# Patient Record
Sex: FEMALE | Race: WHITE | NOT HISPANIC OR LATINO | Employment: STUDENT
[De-identification: names, ages, dates, MRNs, and addresses within clinical notes are randomized per-mention and may not be internally consistent; named-entity substitution may affect disease eponyms.]

---
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
## 2025-01-03 NOTE — PROGRESS NOTES
Medication Therapy Management (MTM) Encounter    ASSESSMENT:                            Medication Adherence/Access: No issues identified.    Mental Health:  Anxiety   Pharmacogenetic (PGx) Results:    Please see [employer] results scanned in media tab (8/12/24) for complete list of results   Results relevant for PGx consult reason:    CYP2B6 normal metabolizer: normal CYP2B6 function  ZDO0D63 intermediate metabolizer: decreased WBM9H17 function  CYP2D6 intermediate metabolizer: decreased CYP2D6 function    TMJ9V65 Intermediate Metabolizer  a. Expected to have somewhat increased levels of medications metabolized by KVV6H89 (e.g., tertiary TCAs, sertraline, escitalopram, citalopram, PPIs, voriconazole ) and may be at increased risk of side effects. Expected to have somewhat decreased levels of medications activated by QOK7T74 (e.g., clopidogrel) and may be at risk for lack of efficacy.   b. Current medications affected: none  c. Past medications impacted: sertraline    CYP2D6 Intermediate Metabolizer  a. Expected to have somewhat increased levels of medications metabolized by CYP2D6 (e.g., TCAs, atomoxetine, aripiprazole, brexpiprazole, haloperidol, risperidone, paroxetine, fluvoxamine, venlafaxine, vortioxetine, ondansetron, metoprolol ) and may be at increased risk of side effects.   b. Current medications affected: none  c. Past medications impacted: fluvoxamine    Pharmacogenetic Assessment and Recommendations:   Several antidepressants are metabolized by XNF9N29 and/or CYP2D6 both of which patient has intermediate activity of. Discussed that this can result in higher levels of medications processed through these enzymes and given this and patient's past med history may be prudent to start at lower doses and monitor closely for side effects if any of these medications are started. Antidepressants that are processed normally by patient include: Wellbutrin, Pristiq, Viibryd, and Fetzima.       PLAN:                             Pharmacogenetic (PGx) results reviewed  Follow-up with psychiatry provider for medication next steps.     Follow-up: MTM as requested    SUBJECTIVE/OBJECTIVE:                          Mey Harper is a 19 year old adult seen for an initial visit. She was referred to me from psychiatry     Reason for visit: [employer] results.    Allergies/ADRs: Reviewed in chart  Past Medical History: Reviewed in chart  Tobacco: She reports that she has never smoked. She has never been exposed to tobacco smoke. She has never used smokeless tobacco.  Alcohol: none    Medication Adherence/Access: no issues reported.    Mental Health   Anxiety  Hydroxyzine as needed      Saw psychiatry provider [facility] 11/13/24; following plan was made:   1) MEDICATION:      -Stop Cymbalta 30 mg daily, pt decreased dose        - Continue hydroxyzine 12.5-25 mg up to twice daily as needed for anxiety    Therapies tried and response:   - fluvoxamine 200mg  - had been on since age 12; stopped in 2024. Stopped working  - sertraline  - reports significantly increased depression and negatively impacted their life, insomnia    Today, patient reports pharmacogenetic (PGx) testing was obtained prior to starting any new antidepressants. They report no side effects from Cymbalta initially, but with higher doses had  insomnia, restlessness/trouble focusing. These side effects have resolved since stopping it.            ----------------      I spent 30 minutes with this patient today.      A summary of these recommendations was sent via [employer].    Marti Guerrero, PharmD, BCPP  Medication Therapy Management Pharmacist  [facility]      Telemedicine Visit Details  The patient's medications can be safely assessed via a telemedicine encounter.  Type of service:  Video Conference via JANZZ  Originating Location (pt. Location): Home    Distant Location (provider location):  Off-site  Start Time: 10:08 AM  End Time:   10:38 AM     Medication Therapy Recommendations  No medication therapy recommendations to display

## 2025-01-06 ENCOUNTER — VIRTUAL VISIT (OUTPATIENT)
Dept: PHARMACY | Facility: CLINIC | Age: 20
End: 2025-01-06
Payer: COMMERCIAL

## 2025-01-06 DIAGNOSIS — F41.1 GAD (GENERALIZED ANXIETY DISORDER): Primary | ICD-10-CM

## 2025-01-06 PROCEDURE — 99207 PR NO CHARGE LOS: CPT | Mod: 95 | Performed by: PHARMACIST

## 2025-01-06 NOTE — Clinical Note
Hello,   I met with patient to review pharmacogenetic (PGx) results. Please see note for summary of results.  Thank you!  Marti Guerrero, PharmD, BCPP Medication Therapy Management Pharmacist [facility]

## 2025-01-06 NOTE — PATIENT INSTRUCTIONS
"Recommendations from today's MTM visit:                                                      Pharmacogenetic (PGx) results reviewed  Follow-up with psychiatry provider for medication next steps.     Follow-up: MTM as requested    It was great speaking with you today.  I value your experience and would be very thankful for your time in providing feedback in our clinic survey. In the next few days, you may receive an email or text message from [facility] with a link to a survey related to your  clinical pharmacist.\"     To schedule another MTM appointment, please call the clinic directly or you may call the MTM scheduling line at [phone] or toll-free at [phone].     My Clinical Pharmacist's contact information:                                                      Please feel free to contact me with any questions or concerns you have.      Marti Guerrero, PharmD, BCPP  Medication Therapy Management Pharmacist  [facility]    "

## 2025-01-08 ASSESSMENT — ANXIETY QUESTIONNAIRES
4. TROUBLE RELAXING: SEVERAL DAYS
2. NOT BEING ABLE TO STOP OR CONTROL WORRYING: NEARLY EVERY DAY
6. BECOMING EASILY ANNOYED OR IRRITABLE: MORE THAN HALF THE DAYS
3. WORRYING TOO MUCH ABOUT DIFFERENT THINGS: MORE THAN HALF THE DAYS
8. IF YOU CHECKED OFF ANY PROBLEMS, HOW DIFFICULT HAVE THESE MADE IT FOR YOU TO DO YOUR WORK, TAKE CARE OF THINGS AT HOME, OR GET ALONG WITH OTHER PEOPLE?: VERY DIFFICULT
5. BEING SO RESTLESS THAT IT IS HARD TO SIT STILL: NOT AT ALL
1. FEELING NERVOUS, ANXIOUS, OR ON EDGE: NEARLY EVERY DAY
7. FEELING AFRAID AS IF SOMETHING AWFUL MIGHT HAPPEN: MORE THAN HALF THE DAYS
IF YOU CHECKED OFF ANY PROBLEMS ON THIS QUESTIONNAIRE, HOW DIFFICULT HAVE THESE PROBLEMS MADE IT FOR YOU TO DO YOUR WORK, TAKE CARE OF THINGS AT HOME, OR GET ALONG WITH OTHER PEOPLE: VERY DIFFICULT
GAD7 TOTAL SCORE: 13
GAD7 TOTAL SCORE: 13

## 2025-01-09 ENCOUNTER — VIRTUAL VISIT (OUTPATIENT)
Dept: PSYCHIATRY | Facility: CLINIC | Age: 20
End: 2025-01-09
Attending: PSYCHIATRY & NEUROLOGY
Payer: COMMERCIAL

## 2025-01-09 VITALS — WEIGHT: 135 LBS | BODY MASS INDEX: 20.46 KG/M2 | HEIGHT: 68 IN

## 2025-01-09 DIAGNOSIS — F41.1 GAD (GENERALIZED ANXIETY DISORDER): Primary | ICD-10-CM

## 2025-01-09 RX ORDER — DESVENLAFAXINE 25 MG/1
25 TABLET, EXTENDED RELEASE ORAL DAILY
Qty: 30 TABLET | Refills: 1 | Status: SHIPPED | OUTPATIENT
Start: 2025-01-09

## 2025-01-09 ASSESSMENT — PAIN SCALES - GENERAL: PAINLEVEL_OUTOF10: NO PAIN (0)

## 2025-01-09 NOTE — PROGRESS NOTES
"  Virtual Visit Details    Type of service:  Video Visit     Originating Location (pt. Location): Home    Distant Location (provider location):  On-site  Platform used for Video Visit: Winifred     Elva Harper is a 19 year old who is being evaluated via a billable video visit.      Pt will join video visit via: Winifred  If there are problems joining the visit, send backup video invite via: Send to preferred e-mail: [email or web address]    Reason for telehealth visit: Patient has requested telehealth visit    Originating location (patient location): Patient's home    Will anyone else be joining the visit? No           PSYCHIATRY CHILD CLINIC PROGRESS NOTE          .Telehealth Details  Type of service:  medication management  Date/time of service: 1/9/25  Start Time: 9. 52 AM  End Time:  10: 14 AM  Chart review/documentation/orders : 30 minutes  Total time : 52   minutes    Distant Site (provider location):  Off-site  Mode of Communication:  Winifred    INTERIM HISTORY                                               Elva Harper is a 19 year old female with a hx of JANAE who presents for an evaluation for transfer of medication management. Patient last seen on 10/4/24 at which time Cymbalta was discontinued. Pt was seen alone    Since the last visit, patient states that she managed to complete the semester without ant meds, but has been struggling more with her anxiety, mom says I am off\" she states that she had the MTM consult recently and they identified 3 meds in the \"use as directed\" zone that she would like to discuss today. She states that she goes back to school around [facility], working now to change her major to better suite her needs. Pt notes that she is still seeing her therapist weekly and notes no SI or SIB or safety concerns.      Social Updates (home/ school/ substance use):  Family relationships: good     School:   Year: freshman at the [employer] - has generals completed, plans to build her own degree in " Design/production/design  IEP/504/Special Education: 504  Suspensions/Expulsions: N/A  Grades: pretty good  School functioning: good balance    RECENT SYMPTOMS:   DEPRESSION:  reports-mood dysregulation;  DENIES- suicidal ideation, depressed mood, low energy, poor concentration /memory and feeling hopeless  ANXIETY:  excessive worry, nervous/overwhelmed and this is more  TRAUMA RELATED:  avoidance, trauma trigger psychological / physiological response, negative beliefs / emotions and hypervigilance  SLEEP:  no issues  EATING DISORDER: none    RECENT SUBSTANCE USE:     None     CURRENT SOCIAL HISTORY:  Financial Support- family or friend.     Siblings- brother 14 and sister 11.     Living Situation- with parents and siblings.    ECFE  0-3, dad works in [place] for [employer]  Social/Spiritual Support- family and therapist.     Feels Safe at Home- Yes.    MEDICAL ROS:  Reports A comprehensive review of systems was performed and is negative other than noted in the HPI..  Denies sedation, fatigue, headache, diaphoresis, dizziness.    PAST PSYCH MED TRIALS     Luvox ( 200 mg ) benefits for anxiety management waned    MEDICAL / SURGICAL HISTORY                                   CARE TEAM:          PCP- Dr Helton                   Therapist- Cora Fry at [facility]    Pregnant or breastfeeding:  NO      Contraception- none  Patient Active Problem List   Diagnosis    Amblyopia    JANAE (generalized anxiety disorder)    Penicillin allergy    Seasonal allergic rhinitis due to pollen    Allergic rhinitis due to cats    Abnormal increased muscle tone       ALLERGY                                Pcn [penicillins]  MEDICATIONS                               Current Outpatient Medications   Medication Sig Dispense Refill    Acetaminophen (TYLENOL PO) Take by mouth every 4 hours as needed for mild pain or fever      hydrOXYzine HCl (ATARAX) 25 MG tablet Take 0.5-1 tablets (12.5-25 mg) by mouth 2  "times daily as needed for anxiety. 30 tablet 2    naproxen sodium 220 MG capsule Take 220 mg by mouth 2 times daily (with meals)         VITALS   Ht 1.727 m (5' 8\")   Wt 61.2 kg (135 lb)   BMI 20.53 kg/m     MENTAL STATUS EXAM                                                             Alertness: alert  and oriented  Appearance: casually groomed  Behavior/Demeanor: cooperative and calm , with good  eye contact   Speech: normal, regular rate and rhythm and precise tonality  Language: intact and no problems  Psychomotor: normal or unremarkable  Mood:  \"not doing well\"  Affect: restricted and appropriate; was congruent to mood; was congruent to content  Thought Process/Associations:  concrete  Thought Content:  Reports none;  Denies suicidal and violent ideation  Perception:  Reports none;  Denies auditory hallucinations and visual hallucinations  Insight: fair  Judgment: fair  Cognition: does  appear grossly intact; formal cognitive testing was not done    LABS and DATA       PHQ9 TODAY = Answers submitted by the patient for this visit:  Answers submitted by the patient for this visit:  Patient Health Questionnaire (G7) (Submitted on 1/8/2025)  JANAE 7 TOTAL SCORE: 13              6/25/2024    12:23 PM 7/25/2024     2:21 PM   PHQ   PHQ-9 Total Score 7 0   Q9: Thoughts of better off dead/self-harm past 2 weeks Not at all Not at all       PSYCHIATRIC DIAGNOSES                                                                                                   Generalized Anxiety Disorder    ASSESSMENT                                   Elva Harper is a 19 year old female with a hx of JANAE who presents for an evaluation for transfer of medication management. Genetic loading for MH concerns include Anxiety. There are no medical diagnoses contributing to presentation, however patient has a hx of Sensory processing disorder which could be impacting presentation.. No critical item hx. Patient does meet criteria for a JANAE, " summary and clinical presentation is supportive of these. Psychosocial stressors include -  academic/school/peer issues. Patient appears insightful and motivated, and is engaged in therapy.     TODAY, meet with patient for a med / MH follow up after Cymbalta was discontinued due to side effects. She presents to discuss med options for anxiety management, after MTM consult which has been reviewed. Provide validation and support, reviewed the 3 options she brought up and after discussion, decided on desvenlafaxine/Pristiq to target her symptoms. Reviewed risks/ benefits and side effect in detail, pepito BB warning. Will start at 25 mg and f/up in a few weeks. She will f/up with therapy in the meantime. No safety concerns today.     Psychiatry Individual Psychotherapy Note   Psychotherapy start time - 9: 54 AM  Psychotherapy end time -  10: 08 AM  Date treatment plan last reviewed with patient - 01/31/24  Subjective: This supportive psychotherapy session addressed issues related to goals of therapy and current psychosocial stressors. Patient's reaction: Pre-contemplation in the context of mental status appropriate for ambulatory setting.    Interactive complexity indicated? No  Plan: RTC in timeframe noted above  Psychotherapy services during this visit included myself and the patient.   Treatment Plan      SYMPTOMS; PROBLEMS   MEASURABLE GOALS;    FUNCTIONAL IMPROVEMENT / GAINS INTERVENTIONS DISCHARGE CRITERIA   Anxiety: nervous/overwhelmed  Psychosocial: relationship stress   develop strategies for thought distraction when ruminating, learn 2 new ways of coping with routine stressors and take steps to improve support network Supportive / psychodynamic marked symptom improvement             PLAN                                                                                                       1) MEDICATION:      -Start Pristiq ER 25 mg daily     - Continue hydroxyzine 12.5-25 mg up to twice daily as needed for  anxiety        2) THERAPY:  Continue    3) LABS NEXT DUE:  none       RATING SCALES:     N/A    4) REFERRALS [CD, medical, other]: none    5) :  none    6) RTC: in about 6 weeks    7) CRISIS NUMBERS: Provided in AVS today  Crisis Text Line for any crisis 24/7 send this-   To: [id number]   [facility]  [phone]      TREATMENT RISK STATEMENT:  The risks, benefits, alternatives and potential adverse effects have been discussed and are understood by the patient/ patient's guardian. The pt understands the risks of using street drugs or alcohol.  There are no medical contraindications, the pt agrees to treatment with the ability to do so.  The patient understands to call 911 or come to the nearest ED if life threatening or urgent symptoms present.        PROVIDER  Nora Farnsworth MD

## 2025-01-09 NOTE — PATIENT INSTRUCTIONS
**For crisis resources, please see the information at the end of this document**   Patient Education    Thank you for coming to the [facility] [facility].     Lab Testing:  If you had lab testing today and your results are reassuring or normal they will be mailed to you or sent through [employer] within 7 days. If the lab tests need quick action we will call you with the results. The phone number we will call with results is # [phone]. If this is not the best number please call our clinic and change the number.     Medication Refills:  If you need any refills please call your pharmacy and they will contact us. Our fax number for refills is [phone].   Three business days of notice are needed for general medication refill requests.   Five business days of notice are needed for controlled substance refill requests.   If you need to change to a different pharmacy, please contact the new pharmacy directly. The new pharmacy will help you get your medications transferred.     Contact Us:  Please call [phone] during business hours (8-5:00 M-F).   If you have medication related questions after clinic hours, or on the weekend, please call [phone].     Financial Assistance [phone]   Medical Records [phone]       MENTAL HEALTH CRISIS RESOURCES:  For a emergency help, please call 911 or go to the nearest Emergency Department.     Emergency Walk-In Options:   [facility] Unit @ [facility] ([place]): [phone] - Specialized mental health emergency area designed to be calming  [facility] ([place]): [phone]  [facility] ([place]): [phone]  [facility]): [phone]    [facility] Crisis Information:   [place]: [phone]  [place]: [phone]  [place] ([place]) - Adult: [phone]     Child: [phone]  [place] - Adult: [phone]     Child: [phone]  [place]:  [phone]  List of all [facility] resources:   https://[place].gov/dhs/people-we-serve/adults/health-care/mental-health/resources/crisis-contacts.jsp    National Crisis Information:   Crisis Text Line: Text  [place]  to 763442  Suicide & Crisis Lifeline: 988  National Suicide Prevention Lifeline: 5-683-015-TALK (1-164.343.4318)       For online chat options, visit https://suicidepreventionlifeline.org/chat/  Poison Control Center: 3-659-477-7558  Trans Lifeline: 2-849-438-0276 - Hotline for transgender people of all ages  The [place] Project: 8-280-682-9911 - Hotline for LGBT youth     For Non-Emergency Support:   Fast Tracker: Mental Health & Substance Use Disorder Resources -   https://www.[employer].org/

## 2025-01-09 NOTE — NURSING NOTE
Current patient location: [address] [address]    Is the patient currently in the state of [place]? YES    Visit mode: VIDEO    If the visit is dropped, the patient can be reconnected by:VIDEO VISIT: Text to cell phone:   Telephone Information:   Mobile [phone]       Will anyone else be joining the visit? NO  (If patient encounters technical issues they should call [phone])    Are changes needed to the allergy or medication list? No    Are refills needed on medications prescribed by this physician? NO    Rooming Documentation:  Questionnaire(s) completed    Reason for visit: RECHECK (F/U)    Lida CHAVES

## 2025-03-19 ENCOUNTER — VIRTUAL VISIT (OUTPATIENT)
Dept: PSYCHIATRY | Facility: CLINIC | Age: 20
End: 2025-03-19
Attending: PSYCHIATRY & NEUROLOGY
Payer: COMMERCIAL

## 2025-03-19 VITALS — BODY MASS INDEX: 20.46 KG/M2 | WEIGHT: 135 LBS | HEIGHT: 68 IN

## 2025-03-19 DIAGNOSIS — F41.1 GAD (GENERALIZED ANXIETY DISORDER): ICD-10-CM

## 2025-03-19 RX ORDER — DESVENLAFAXINE 25 MG/1
25 TABLET, EXTENDED RELEASE ORAL DAILY
Qty: 30 TABLET | Refills: 3 | Status: SHIPPED | OUTPATIENT
Start: 2025-03-19

## 2025-03-19 ASSESSMENT — PAIN SCALES - GENERAL: PAINLEVEL_OUTOF10: NO PAIN (0)

## 2025-03-19 NOTE — NURSING NOTE
Current patient location:  At school    Is the patient currently in the state of [place]? YES    Visit mode: VIDEO    If the visit is dropped, the patient can be reconnected by:VIDEO VISIT: Send to e-mail at: [email or web address]    Will anyone else be joining the visit? NO  (If patient encounters technical issues they should call [phone])    Are changes needed to the allergy or medication list? No    Are refills needed on medications prescribed by this physician? YES    Rooming Documentation:  Questionnaire(s) completed    Reason for visit: RECHECK    Abimbola CHAVES

## 2025-03-19 NOTE — PROGRESS NOTES
Virtual Visit Details    Type of service:  Video Visit     Originating Location (pt. Location): Home    Distant Location (provider location):  Off-site  Platform used for Video Visit: [facility] CHILD CLINIC PROGRESS NOTE          .Telehealth Details  Type of service:  medication management  Date/time of service: 3/19/25  Start Time: 9. 00 AM  End Time:  9: 15 AM  Chart review/documentation/orders : 30 minutes  Total time : 45   minutes    Distant Site (provider location):  Off-site  Mode of Communication:  [facility]    INTERIM HISTORY                                               Elva Harper is a 19 year old female with a hx of JANAE who presents for an evaluation for transfer of medication management. Patient last seen on 1/9/25 at which time Pristiq was started. Pt was seen alone    Since the last visit, patient states that she has been doing much better since starting Pristiq, with no side effects. She has been doing well in school and managed to switch her major which is a big stress relief. She has been more optimistic and excited about her potential career goals after an alumni event where real life experiences were shared. 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 She continues with her weekly therapy and has her bff from  as a stable support person, which is very helpful. Mood has been good and she is actively working to improve mood via creating design outlets, anxiety is at a 4/10 (10 is worst).  Pt notes no SI or SIB or safety concerns.       Social Updates (home/ school/ substance use):  Family relationships: good     School:   Year: freshman at the [employer] - has generals completed, plans to build her own degree in Design/production/design  IEP/504/Special Education: 504  Suspensions/Expulsions: N/A  Grades: pretty good  School functioning: good balance    RECENT SYMPTOMS:    DEPRESSION:  reports- none ;  DENIES- suicidal ideation, depressed mood, low energy, poor concentration /memory and feeling hopeless  ANXIETY:  nervous/overwhelmed and this is better  TRAUMA RELATED:  avoidance, trauma trigger psychological / physiological response, negative beliefs / emotions and hypervigilance  SLEEP:  no issues  EATING DISORDER: none    RECENT SUBSTANCE USE:     None     CURRENT SOCIAL HISTORY:  Financial Support- family or friend.     Siblings- brother 14 and sister 11.     Living Situation- with parents and siblings.    ECFE  0-3, dad works in IT for [employer] - [employer]  Social/Spiritual Support- family and therapist.     Feels Safe at Home- Yes.    MEDICAL ROS:  Reports A comprehensive review of systems was performed and is negative other than noted in the HPI..  Denies sedation, fatigue, headache, diaphoresis, dizziness.    PAST PSYCH MED TRIALS     Luvox ( 200 mg ) benefits for anxiety management waned    MEDICAL / SURGICAL HISTORY                                   CARE TEAM:          PCP- Dr Helton                   Therapist- Cora Fry at [facility]    Pregnant or breastfeeding:  NO      Contraception- none  Patient Active Problem List   Diagnosis    Amblyopia    JANAE (generalized anxiety disorder)    Penicillin allergy    Seasonal allergic rhinitis due to pollen    Allergic rhinitis due to cats    Abnormal increased muscle tone       ALLERGY                                Pcn [penicillins]  MEDICATIONS                               Current Outpatient Medications   Medication Sig Dispense Refill    Acetaminophen (TYLENOL PO) Take by mouth every 4 hours as needed for mild pain or fever      desvenlafaxine succinate (PRISTIQ) 25 MG 24 hr tablet Take 1 tablet (25 mg) by mouth daily. 30 tablet 1    hydrOXYzine HCl (ATARAX) 25 MG tablet Take 0.5-1 tablets (12.5-25 mg) by mouth 2 times daily as needed for anxiety. 30 tablet 2    naproxen sodium 220 MG capsule  "Take 220 mg by mouth 2 times daily (with meals)         VITALS   Ht 1.727 m (5' 8\")   Wt 61.2 kg (135 lb)   BMI 20.53 kg/m     MENTAL STATUS EXAM                                                             Alertness: alert  and oriented  Appearance: casually groomed  Behavior/Demeanor: cooperative and calm , with fair  eye contact   Speech: normal, regular rate and rhythm and precise tonality  Language: intact and no problems  Psychomotor: normal or unremarkable  Mood:  \"generally pretty good\"  Affect: restricted, appropriate, and minimally brighter ; was congruent to mood; was congruent to content  Thought Process/Associations:  concrete  Thought Content:  Reports none;  Denies suicidal and violent ideation  Perception:  Reports none;  Denies auditory hallucinations and visual hallucinations  Insight: fair  Judgment: fair  Cognition: does  appear grossly intact; formal cognitive testing was not done    LABS and DATA       PHQ9 TODAY = Answers submitted by the patient for this visit:  Answers submitted by the patient for this visit:  Patient Health Questionnaire (G7) (Submitted on 1/8/2025)  JANAE 7 TOTAL SCORE: 13              6/25/2024    12:23 PM 7/25/2024     2:21 PM   PHQ   PHQ-9 Total Score 7 0   Q9: Thoughts of better off dead/self-harm past 2 weeks Not at all Not at all       PSYCHIATRIC DIAGNOSES                                                                                                   Generalized Anxiety Disorder    ASSESSMENT                                   Elva Harper is a 19 year old female with a hx of JANAE who presents for an evaluation for transfer of medication management. Genetic loading for MH concerns include Anxiety. There are no medical diagnoses contributing to presentation, however patient has a hx of Sensory processing disorder which could be impacting presentation.. No critical item hx. Patient does meet criteria for a JANAE, summary and clinical presentation is supportive of " these. Psychosocial stressors include -  academic/school/peer issues. Patient appears insightful and motivated, and is engaged in therapy.     TODAY, meet with patient for a med / MH follow up after switching to Pristiq 25 mg for improved management of anxiety which has been beneficial, no side effects. Provide validation and support. She has been doing better with juggling expectations and academics, work and peer relationships. No med change indicated at this point, encourage behavioral strategies and f/up with therapy in the meantime. No safety concerns today.     Psychiatry Individual Psychotherapy Note   Psychotherapy start time - 9: 04 AM  Psychotherapy end time -  9: 14 AM  Date treatment plan last reviewed with patient - 01/31/24  Subjective: This supportive psychotherapy session addressed issues related to goals of therapy and current psychosocial stressors. Patient's reaction: Pre-contemplation in the context of mental status appropriate for ambulatory setting.    Interactive complexity indicated? No  Plan: RTC in timeframe noted above  Psychotherapy services during this visit included myself and the patient.   Treatment Plan      SYMPTOMS; PROBLEMS   MEASURABLE GOALS;    FUNCTIONAL IMPROVEMENT / GAINS INTERVENTIONS DISCHARGE CRITERIA   Anxiety: nervous/overwhelmed  Psychosocial: relationship stress   develop strategies for thought distraction when ruminating, learn 2 new ways of coping with routine stressors and take steps to improve support network Supportive / psychodynamic marked symptom improvement     The longitudinal plan of care for the diagnosis(es)/condition(s) as documented were addressed during this visit. Due to the added complexity in care, I will continue to support Indigo in the subsequent management and with ongoing continuity of care.    PLAN                                                                                                       1) MEDICATION:      -Continue Pristiq ER 25 mg  daily     - Continue hydroxyzine 12.5-25 mg up to twice daily as needed for anxiety        2) THERAPY:  Continue    3) LABS NEXT DUE:  none       RATING SCALES:     N/A    4) REFERRALS [CD, medical, other]: none    5) :  none    6) RTC: in about 3 months    7) CRISIS NUMBERS: Provided in AVS today  Crisis Text Line for any crisis 24/7 send this-   To: [id number]   [facility] ER  [phone]      TREATMENT RISK STATEMENT:  The risks, benefits, alternatives and potential adverse effects have been discussed and are understood by the patient/ patient's guardian. The pt understands the risks of using street drugs or alcohol.  There are no medical contraindications, the pt agrees to treatment with the ability to do so.  The patient understands to call 911 or come to the nearest ED if life threatening or urgent symptoms present.        PROVIDER  Nora Farnsworth MD

## 2025-03-19 NOTE — PATIENT INSTRUCTIONS
**For crisis resources, please see the information at the end of this document**   Patient Education    Thank you for coming to the [facility] [place] [facility].     Lab Testing:  If you had lab testing today and your results are reassuring or normal they will be mailed to you or sent through [employer] within 7 days. If the lab tests need quick action we will call you with the results. The phone number we will call with results is # [phone]. If this is not the best number please call our clinic and change the number.     Medication Refills:  If you need any refills please call your pharmacy and they will contact us. Our fax number for refills is [phone].   Three business days of notice are needed for general medication refill requests.   Five business days of notice are needed for controlled substance refill requests.   If you need to change to a different pharmacy, please contact the new pharmacy directly. The new pharmacy will help you get your medications transferred.     Contact Us:  Please call [phone] during business hours (8-5:00 M-F).   If you have medication related questions after clinic hours, or on the weekend, please call [phone].     Financial Assistance [phone]   Medical Records [phone]       MENTAL HEALTH CRISIS RESOURCES:  For a emergency help, please call 911 or go to the nearest Emergency Department.     Emergency Walk-In Options:   EmPATH Unit @ [facility] ([place]): [phone] - Specialized mental health emergency area designed to be calming  [facility] ([place]): [phone]  [facility] ([place]): [phone]  [facility]): [phone]    [facility] Crisis Information:   [place]: [phone]  [place]: [phone]  [place] ([place]) - Adult: [phone]     Child: [phone]  [place] - Adult: [phone]     Child: [phone]  [place]:  [phone]  List of all [facility] resources:   https://[place].gov/dhs/people-we-serve/adults/health-care/mental-health/resources/crisis-contacts.jsp    National Crisis Information:   Crisis Text Line: Text  [place]  to 098663  Suicide & Crisis Lifeline: 988  National Suicide Prevention Lifeline: 7-258-064-TALK (1-229.582.5171)       For online chat options, visit https://suicidepreventionlifeline.org/chat/  Poison Control Center: 6-521-514-0429  Trans Lifeline: 7-420-468-5748 - Hotline for transgender people of all ages  The [place] Project: 5-402-639-1571 - Hotline for LGBT youth     For Non-Emergency Support:   Fast Tracker: Mental Health & Substance Use Disorder Resources -   https://www.[employer].org/

## 2025-06-25 ENCOUNTER — PATIENT OUTREACH (OUTPATIENT)
Dept: CARE COORDINATION | Facility: CLINIC | Age: 20
End: 2025-06-25
Payer: COMMERCIAL

## 2025-07-09 ENCOUNTER — PATIENT OUTREACH (OUTPATIENT)
Dept: CARE COORDINATION | Facility: CLINIC | Age: 20
End: 2025-07-09
Payer: COMMERCIAL

## 2025-07-18 DIAGNOSIS — F41.1 GAD (GENERALIZED ANXIETY DISORDER): ICD-10-CM

## 2025-07-21 RX ORDER — DESVENLAFAXINE 25 MG/1
25 TABLET, EXTENDED RELEASE ORAL DAILY
Qty: 30 TABLET | Refills: 0 | Status: SHIPPED | OUTPATIENT
Start: 2025-07-21 | End: 2025-07-24

## 2025-07-21 NOTE — TELEPHONE ENCOUNTER
"LOV: 3/19/25  RTC: 3 months  No shows: 0  Cancellations: 0  FOV: 7/24/25  ------------------------------  Last Script Details:  desvenlafaxine succinate (PRISTIQ) 25 MG 24 hr tablet 30 tablet 3 3/19/2025     ------------------------------  Last visit note:   \"Continue Pristiq ER 25 mg daily \"        Refill Decision:    [x]Medication refilled per  Medication Refill in Ambulatory Care  policy; Psych protocol.           Request from pharmacy:  Requested Prescriptions   Pending Prescriptions Disp Refills    desvenlafaxine succinate (PRISTIQ) 25 MG 24 hr tablet [Pharmacy Med Name: Desvenlafaxine Succinate ER 25 MG Oral Tablet Extended Release 24 Hour] 30 tablet 0     Sig: Take 1 tablet by mouth once daily       Serotonin-Norepinephrine Reuptake Inhibitors  Passed - 7/21/2025  4:00 PM        Passed - Most recent blood pressure under 140/90 in past 12 months     BP Readings from Last 3 Encounters:   07/25/24 112/75   06/25/24 104/71   07/17/23 99/66 (9%/ 51%)*     *BP percentiles are based on the 2017 AAP Clinical Practice Guideline for girls       No data recorded            Passed - Medication is active on med list and the sig matches. RN to manually verify dose and sig if red X/fail.     If the protocol passes (green check), you do not need to verify med dose and sig.    A prescription matches if they are the same clinical intention.    For Example: once daily and every morning are the same.    The protocol can not identify upper and lower case letters as matching and will fail.     For Example: Take 1 tablet (50 mg) by mouth daily     TAKE 1 TABLET (50 MG) BY MOUTH DAILY    For all fails (red x), verify dose and sig.    If the refill does match what is on file, the RN can still proceed to approve the refill request.       If they do not match, route to the appropriate provider.             Passed - Recent (12 month) or future (90 days) visit with authorizing provider's specialty (provided they have been seen in the " past 15 months)     The patient must have completed an in-person or virtual visit within the past 12 months or has a future visit scheduled within the next 90 days with the authorizing provider s specialty.  Urgent care and e-visits do not qualify as an office visit for this protocol.          Passed - Medication indicated for associated diagnosis     Medication is associated with one or more of the following diagnoses:  Anxiety  Bipolar  Chronic musculoskeletal pain  Depression  Fibromyalgia  Headache  Migraine  Neuropathy  Obsessive compulsive disorder  Panic disorder  Social phobia  Mood disorder  Menopause  Hot flashes/Menopausal flushing  Fibromyitis  Flushing          Passed - JANAE-7 score on file during last 12 months     Please review last JANAE-7 score.       7/25/2024     2:21 PM 10/2/2024    11:47 AM 1/8/2025    12:48 PM   JANAE-7 SCORE   Total Score  8 (mild anxiety) 13 (moderate anxiety)   Total Score 4 8 13        Patient-reported             Passed - Patient is age 18 or older        Passed - No active pregnancy on record        Passed - No positive pregnancy test in past 12 months

## 2025-07-23 ASSESSMENT — ANXIETY QUESTIONNAIRES
1. FEELING NERVOUS, ANXIOUS, OR ON EDGE: MORE THAN HALF THE DAYS
4. TROUBLE RELAXING: SEVERAL DAYS
IF YOU CHECKED OFF ANY PROBLEMS ON THIS QUESTIONNAIRE, HOW DIFFICULT HAVE THESE PROBLEMS MADE IT FOR YOU TO DO YOUR WORK, TAKE CARE OF THINGS AT HOME, OR GET ALONG WITH OTHER PEOPLE: SOMEWHAT DIFFICULT
GAD7 TOTAL SCORE: 8
6. BECOMING EASILY ANNOYED OR IRRITABLE: SEVERAL DAYS
7. FEELING AFRAID AS IF SOMETHING AWFUL MIGHT HAPPEN: SEVERAL DAYS
8. IF YOU CHECKED OFF ANY PROBLEMS, HOW DIFFICULT HAVE THESE MADE IT FOR YOU TO DO YOUR WORK, TAKE CARE OF THINGS AT HOME, OR GET ALONG WITH OTHER PEOPLE?: SOMEWHAT DIFFICULT
7. FEELING AFRAID AS IF SOMETHING AWFUL MIGHT HAPPEN: SEVERAL DAYS
5. BEING SO RESTLESS THAT IT IS HARD TO SIT STILL: NOT AT ALL
2. NOT BEING ABLE TO STOP OR CONTROL WORRYING: MORE THAN HALF THE DAYS
GAD7 TOTAL SCORE: 8
3. WORRYING TOO MUCH ABOUT DIFFERENT THINGS: SEVERAL DAYS
GAD7 TOTAL SCORE: 8

## 2025-07-23 NOTE — COMMUNITY RESOURCES LIST (ENGLISH)
Utilities  Bill-Pay Assistance   Program Provider: [facility]  Program Website : [email or web address]://[facility]/overcome-poverty/  Program Phone Number: [phone]  Next Steps: get more info [email or web address]/[facility]/contact-us/    Program Locations:   Address:  [address] [place] [address]   Distance:  6.77 mi   Office Phone Number: [phone]    Hours:   Monday: 8:00 AM - 5:00 PM   Tuesday: 8:00 AM - 5:00 PM   Wednesday: 8:00 AM - 5:00 PM   Thursday: 8:00 AM - 5:00 PM   Friday: 8:00 AM - 5:00 PM   Saturday: CLOSED   Sunday: CLOSED     Health Care Coverage Enrollment Assistance   Program Provider: [employer]  Program Website : [email or web address]  Next Steps: Call [phone]    Program Locations:   Address:  [address] [place] [address]   Distance:  10.68 mi   Office Phone Number: [phone]    Hours:   Monday: 8:00 AM - 4:30 PM   Tuesday: 8:00 AM - 4:30 PM   Wednesday: 8:00 AM - 4:30 PM   Thursday: 8:00 AM - 4:30 PM   Friday: 8:00 AM - 4:30 PM   Saturday: CLOSED   Sunday: CLOSED     Energy Assistance Program (EAP)   Program Provider: [employer]  Program Website : https://[place].mangofizz jobs/[employer]/consumers/consumer-assistance/energy-assistance/  Program Phone Number: [phone]  Next Steps: Go to https://[place].mangofizz jobs/[employer]/consumers/consumer-assistance/energy-assistance/    Program Locations:   Address:  [address] [place] [address]   Distance:  13.0 mi   Office Phone Number: [phone]    Hours:   Monday: 8:00 AM - 5:00 PM   Tuesday: 8:00 AM - 5:00 PM   Wednesday: 8:00 AM - 5:00 PM   Thursday: 8:00 AM - 5:00 PM   Friday: 8:00 AM - 5:00 PM   Saturday: CLOSED   Sunday: CLOSED     Housing  [employer]   Program Provider: [employer]  Program Website : https://www.housinglink.org/  Next Steps: Go to  [email or web address]/    Program Locations:   Address:  [address] [place] [address]   Distance:  8.04 mi   Office Phone Number: [phone]    Hours:   Monday: 8:00 AM - 5:00 PM   Tuesday: 8:00 AM - 5:00 PM   Wednesday: 8:00 AM - 5:00 PM   Thursday: 8:00 AM - 5:00 PM   Friday: 8:00 AM - 5:00 PM   Saturday: CLOSED   Sunday: CLOSED     Affordable [employer]   Program Provider: [employer]  Program Website : [email or web address]/  Next Steps: Go to [email or web address]://[employer]/    Program Locations:   Address:  [address] [place] [address]   Distance:  1022.03 mi     Hours:   Monday: 8:00 AM - 5:00 PM   Tuesday: 8:00 AM - 5:00 PM   Wednesday: 8:00 AM - 5:00 PM   Thursday: 8:00 AM - 5:00 PM   Friday: 8:00 AM - 5:00 PM   Saturday: CLOSED   Sunday: CLOSED

## 2025-07-24 ENCOUNTER — VIRTUAL VISIT (OUTPATIENT)
Dept: PSYCHIATRY | Facility: CLINIC | Age: 20
End: 2025-07-24
Attending: PSYCHIATRY & NEUROLOGY
Payer: COMMERCIAL

## 2025-07-24 ENCOUNTER — TELEPHONE (OUTPATIENT)
Dept: PSYCHIATRY | Facility: CLINIC | Age: 20
End: 2025-07-24
Payer: COMMERCIAL

## 2025-07-24 DIAGNOSIS — F41.1 GAD (GENERALIZED ANXIETY DISORDER): ICD-10-CM

## 2025-07-24 RX ORDER — DESVENLAFAXINE 25 MG/1
25 TABLET, EXTENDED RELEASE ORAL DAILY
Qty: 30 TABLET | Refills: 3 | Status: SHIPPED | OUTPATIENT
Start: 2025-07-24

## 2025-07-24 RX ORDER — DESVENLAFAXINE 25 MG/1
25 TABLET, EXTENDED RELEASE ORAL DAILY
Qty: 30 TABLET | Refills: 0 | Status: SHIPPED | OUTPATIENT
Start: 2025-07-24 | End: 2025-07-24

## 2025-07-24 ASSESSMENT — PAIN SCALES - GENERAL: PAINLEVEL_OUTOF10: NO PAIN (0)

## 2025-07-24 NOTE — PROGRESS NOTES
"  Virtual Visit Details    Type of service:  Video Visit     Originating Location (pt. Location): Home    Distant Location (provider location):  Off-site  Platform used for Video Visit: [facility]           PSYCHIATRY CHILD CLINIC PROGRESS NOTE          .Telehealth Details  Type of service:  medication management  Date/time of service: 7/24/25  Start Time: 8. 32 AM  End Time:  8: 55 AM  Chart review/documentation/orders : 30 minutes  Total time : 53   minutes    Distant Site (provider location):  Off-site  Mode of Communication:  [facility]    INTERIM HISTORY                                               Elva Harper is a 19 year old female with a hx of JANAE who presents for an evaluation for transfer of medication management. Patient last seen on 3/19/25 at which time no changes  were made. 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 She is excited as she just found out that she is able to do a 3rd year based on her financial state - as she switched her major recently and forged her own path via the [employer] program (taking mostly theater and business classes). Finds this very supportive and looking forward to greater opportunities. She is also hoping to make new friends on campus next year as she is now academically sound.    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 She continues with her weekly therapy and has her bff from  as a stable support person, which is very helpful. Mood has been good and she is actively working to improve mood via creating design outlets, anxiety is minimal and able to be controlled, \"easier to cope as meds are right\". Medication going well, did have a few days " last week that she ran out and experienced withdrawal side effects ( was disoriented, head foggy and unable to commute safely and engage at work), so was unable to go to work, will need a letter of support for this. Pt notes no SI or SIB or safety concerns.         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  IEP/504/Special Education: 504  Suspensions/Expulsions: N/A  Grades: pretty good  School functioning: good balance  Work:  assistant,    RECENT SYMPTOMS:   DEPRESSION:  reports-none;  DENIES- suicidal ideation, depressed mood, low energy, poor concentration /memory and feeling hopeless  ANXIETY:  nervous/overwhelmed and this is better  TRAUMA RELATED:  avoidance, trauma trigger psychological / physiological response, negative beliefs / emotions and hypervigilance  SLEEP:  no issues  EATING DISORDER: none    RECENT SUBSTANCE USE:     None     CURRENT SOCIAL HISTORY:  Financial Support- 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     Siblings- brother 14 and sister 11.     Living Situation- with parents and siblings.    ECFE  0-3, dad works in IT for [employer]  Social/Spiritual Support- family and therapist.     Feels Safe at Home- Yes.    MEDICAL ROS:  Reports A comprehensive review of systems was performed and is negative other than noted in the HPI..  Denies sedation, fatigue, headache, diaphoresis, dizziness.    PAST PSYCH MED TRIALS     Luvox ( 200 mg ) benefits for anxiety management waned    MEDICAL / SURGICAL HISTORY                                   CARE TEAM:          PCP- Dr Helton                   Therapist- Cora Fry at [facility]    Pregnant or breastfeeding:  NO      Contraception-  "none  Patient Active Problem List   Diagnosis    Amblyopia    JANAE (generalized anxiety disorder)    Penicillin allergy    Seasonal allergic rhinitis due to pollen    Allergic rhinitis due to cats    Abnormal increased muscle tone       ALLERGY                                Pcn [penicillins]  MEDICATIONS                               Current Outpatient Medications   Medication Sig Dispense Refill    Acetaminophen (TYLENOL PO) Take by mouth every 4 hours as needed for mild pain or fever      desvenlafaxine succinate (PRISTIQ) 25 MG 24 hr tablet Take 1 tablet (25 mg) by mouth daily. 30 tablet 0    naproxen sodium 220 MG capsule Take 220 mg by mouth 2 times daily (with meals)      hydrOXYzine HCl (ATARAX) 25 MG tablet Take 0.5-1 tablets (12.5-25 mg) by mouth 2 times daily as needed for anxiety. 30 tablet 2       VITALS   There were no vitals taken for this visit.   MENTAL STATUS EXAM                                                             Alertness: alert  and oriented  Appearance: casually groomed  Behavior/Demeanor: cooperative and calm, with good  eye contact   Speech: normal, regular rate and rhythm and precise tonality  Language: intact and no problems  Psychomotor: normal or unremarkable  Mood: \"pretty good\"  Affect: restricted, appropriate, and brighter; was congruent to mood; was congruent to content  Thought Process/Associations: concrete, linear   Thought Content:  Reports none;  Denies suicidal and violent ideation  Perception:  Reports none;  Denies auditory hallucinations and visual hallucinations  Insight: good  Judgment: good  Cognition: does  appear grossly intact; formal cognitive testing was not done    LABS and DATA       PHQ9 TODAY = Answers submitted by the patient for this visit:  Answers submitted by the patient for this visit:  Patient Health Questionnaire (G7) (Submitted on 7/23/2025)  JANAE 7 TOTAL SCORE: 8          6/25/2024    12:23 PM 7/25/2024     2:21 PM   PHQ   PHQ-9 Total Score 7 0 "   Q9: Thoughts of better off dead/self-harm past 2 weeks Not at all Not at all       PSYCHIATRIC DIAGNOSES                                                                                                   Generalized Anxiety Disorder    ASSESSMENT                                   Elva Harper is a 19 year old female with a hx of JANAE who presents for an evaluation for transfer of medication management. Genetic loading for MH concerns include Anxiety. There are no medical diagnoses contributing to presentation, however patient has a hx of Sensory processing disorder which could be impacting presentation.. No critical item hx. Patient does meet criteria for a JANAE, summary and clinical presentation is supportive of these. Psychosocial stressors include -  academic/school/peer issues. Patient appears insightful and motivated, and is engaged in therapy.     TODAY, meet with patient for a med / MH follow up after switching to Pristiq 25 mg in March 2025 for improved management of anxiety which has been beneficial. Provide validation and support. She has been doing better with juggling work expectations and academics, work and peer relationships. She is requesting a letter of support for her employer due to a medical absence last week, will coordinate with RN partner. No med dose changes to Pristiq indicated at this point,although will discontinue hydroxyzine which she doesn't take anymore. Encourage behavioral strategies and f/up with therapy No safety concerns today.     Psychiatry Individual Psychotherapy Note   Psychotherapy start time - 8: 35 AM  Psychotherapy end time -  8: 52 AM  Date treatment plan last reviewed with patient - 01/31/24  Subjective: This supportive psychotherapy session addressed issues related to goals of therapy and current psychosocial stressors. Patient's reaction: Pre-contemplation in the context of mental status appropriate for ambulatory setting.    Interactive complexity indicated?  No  Plan: RTC in timeframe noted above  Psychotherapy services during this visit included myself and the patient.   Treatment Plan      SYMPTOMS; PROBLEMS   MEASURABLE GOALS;    FUNCTIONAL IMPROVEMENT / GAINS INTERVENTIONS DISCHARGE CRITERIA   Anxiety: nervous/overwhelmed  Psychosocial: relationship stress   develop strategies for thought distraction when ruminating, learn 2 new ways of coping with routine stressors and take steps to improve support network Supportive / psychodynamic marked symptom improvement     The longitudinal plan of care for the diagnosis(es)/condition(s) as documented were addressed during this visit. Due to the added complexity in care, I will continue to support Indigo in the subsequent management and with ongoing continuity of care.    PLAN                                                                                                       1) MEDICATION:      -Continue Pristiq ER 25 mg daily     - Discontinue hydroxyzine 12.5-25 mg up to twice daily as needed for anxiety, not taking        2) THERAPY:  Continue    3) LABS NEXT DUE:  none       RATING SCALES:     N/A    4) REFERRALS [CD, medical, other]: none    5) :  none    6) RTC: in about 3 months    7) CRISIS NUMBERS: Provided in [facility] today  Crisis Text Line for any crisis 24/7 send this-   To: [phone]   [facility]  [phone]      TREATMENT RISK STATEMENT:  The risks, benefits, alternatives and potential adverse effects have been discussed and are understood by the patient/ patient's guardian. The pt understands the risks of using street drugs or alcohol.  There are no medical contraindications, the pt agrees to treatment with the ability to do so.  The patient understands to call 911 or come to the nearest ED if life threatening or urgent symptoms present.        PROVIDER  Nora Farnsworth MD

## 2025-07-24 NOTE — PATIENT INSTRUCTIONS
**For crisis resources, please see the information at the end of this document**   Patient Education    Thank you for coming to the [facility] [place] [facility].     Lab Testing:  If you had lab testing today and your results are reassuring or normal they will be mailed to you or sent through [employer] within 7 days. If the lab tests need quick action we will call you with the results. The phone number we will call with results is # [phone]. If this is not the best number please call our clinic and change the number.     Medication Refills:  If you need any refills please call your pharmacy and they will contact us. Our fax number for refills is [phone].   Three business days of notice are needed for general medication refill requests.   Five business days of notice are needed for controlled substance refill requests.   If you need to change to a different pharmacy, please contact the new pharmacy directly. The new pharmacy will help you get your medications transferred.     Contact Us:  Please call [phone] during business hours (8-5:00 M-F).   If you have medication related questions after clinic hours, or on the weekend, please call [phone].     Financial Assistance [phone]   Medical Records [phone]       MENTAL HEALTH CRISIS RESOURCES:  For a emergency help, please call 911 or go to the nearest Emergency Department.     Emergency Walk-In Options:   EmPATH Unit @ [place] (Mobile): [phone] - Specialized mental health emergency area designed to be calming  [facility] ([place]): [phone]  [facility] ([place]): [phone]  [facility]): [phone]    [facility] Crisis Information:   [place]: [phone]  [place]: [phone]  [place] ([place]) - Adult: [phone]     Child: [phone]  [place] - Adult: [phone]     Child: [phone]  [place]:  [phone]  List of all [facility] resources:   https://[place].gov/dhs/people-we-serve/adults/health-care/mental-health/resources/crisis-contacts.jsp    National Crisis Information:   Crisis Text Line: Text  [place]  to 503851  Suicide & Crisis Lifeline: 988  National Suicide Prevention Lifeline: 0-536-866-TALK (1-466.162.8430)       For online chat options, visit https://suicidepreventionlifeline.org/chat/  Poison Control Center: 0-771-745-5568  Trans Lifeline: 1-021-873-5592 - Hotline for transgender people of all ages  The [place] Project: 7-210-437-1182 - Hotline for LGBT youth     For Non-Emergency Support:   Fast Tracker: Mental Health & Substance Use Disorder Resources -   https://www.[employer].org/

## 2025-07-24 NOTE — TELEPHONE ENCOUNTER
Escobar Martínez,     This patient let me know they ran out of their medication ( Pristiq) for a few days last week and had to call in sick to work last Friday due to withdrawal side effects ( headaches, disorientation etc). Their boss is requesting a letter to confirm this.     Would you pls draft this when you have time ( no rush) - would be a short letter briefly stating that we are aware of this possibility, ( as she is under our care and is prescribed medications that can cause various somatic effects that are best managed safely via supportive and pharmacological measures at home or something of that nature) and to please excuse her absence from work last Friday.     We could use my e-sig and I can review before sending (what would be the best way to send it over - via [employer])?     Thanks,   Raegan     Follow up:  Letter pended and routed to Dr Farnsworth for review.  Letter approved by Dr Farnsworth and sent to patient via [employer].

## 2025-07-24 NOTE — LETTER
7/24/2025       RE: Elva Harper  [address]  [place] [address]       To Whom It May Concern,    Mey (Kody Harper is under my care at [facility]. Mey is prescribed medications that may cause side effects which are best managed safely via supportive and pharmacological measures at home. Please excuse Mey from work on Friday, July 18 2025 for this reason.    Please contact the clinic if you require any further information.      Sincerely,          Nora Farnsworth MD

## 2025-07-24 NOTE — NURSING NOTE
Current patient location: [address] [facility] [address]    Is the patient currently in the state of [place]? YES    Visit mode: VIDEO    If the visit is dropped, the patient can be reconnected by:VIDEO VISIT: Text to cell phone:   Telephone Information:   Mobile [phone]       Will anyone else be joining the visit? NO  (If patient encounters technical issues they should call [phone]    Are changes needed to the allergy or medication list? No    Are refills needed on medications prescribed by this physician? YES    Rooming Documentation:  Questionnaire(s) completed    Reason for visit: RECHECK    Elicia GARF